# Patient Record
Sex: MALE | Race: BLACK OR AFRICAN AMERICAN
[De-identification: names, ages, dates, MRNs, and addresses within clinical notes are randomized per-mention and may not be internally consistent; named-entity substitution may affect disease eponyms.]

---

## 2018-01-12 ENCOUNTER — HOSPITAL ENCOUNTER (INPATIENT)
Dept: HOSPITAL 74 - YASAS | Age: 43
LOS: 14 days | Discharge: HOME | DRG: 772 | End: 2018-01-26
Attending: PSYCHIATRY & NEUROLOGY | Admitting: PSYCHIATRY & NEUROLOGY
Payer: COMMERCIAL

## 2018-01-12 VITALS — BODY MASS INDEX: 26.4 KG/M2

## 2018-01-12 DIAGNOSIS — F17.210: ICD-10-CM

## 2018-01-12 DIAGNOSIS — F16.20: Primary | ICD-10-CM

## 2018-01-12 DIAGNOSIS — F32.9: ICD-10-CM

## 2018-01-12 LAB
ALBUMIN SERPL-MCNC: 3.9 G/DL (ref 3.4–5)
ALP SERPL-CCNC: 79 U/L (ref 45–117)
ALT SERPL-CCNC: 29 U/L (ref 12–78)
ANION GAP SERPL CALC-SCNC: 8 MMOL/L (ref 8–16)
APPEARANCE UR: (no result)
AST SERPL-CCNC: 21 U/L (ref 15–37)
BILIRUB SERPL-MCNC: 0.5 MG/DL (ref 0.2–1)
BILIRUB UR STRIP.AUTO-MCNC: NEGATIVE MG/DL
BUN SERPL-MCNC: 12 MG/DL (ref 7–18)
CALCIUM SERPL-MCNC: 9.3 MG/DL (ref 8.5–10.1)
CHLORIDE SERPL-SCNC: 105 MMOL/L (ref 98–107)
CO2 SERPL-SCNC: 29 MMOL/L (ref 21–32)
COLOR UR: (no result)
CREAT SERPL-MCNC: 0.9 MG/DL (ref 0.7–1.3)
DEPRECATED RDW RBC AUTO: 12.5 % (ref 11.9–15.9)
GLUCOSE SERPL-MCNC: 81 MG/DL (ref 74–106)
HCT VFR BLD CALC: 40.7 % (ref 35.4–49)
HGB BLD-MCNC: 13.4 GM/DL (ref 11.7–16.9)
KETONES UR QL STRIP: NEGATIVE
LEUKOCYTE ESTERASE UR QL STRIP.AUTO: NEGATIVE
MCH RBC QN AUTO: 32.7 PG (ref 25.7–33.7)
MCHC RBC AUTO-ENTMCNC: 32.8 G/DL (ref 32–35.9)
MCV RBC: 99.8 FL (ref 80–96)
NITRITE UR QL STRIP: NEGATIVE
PH UR: 5 [PH] (ref 5–8)
PLATELET # BLD AUTO: 336 K/MM3 (ref 134–434)
PMV BLD: 8.2 FL (ref 7.5–11.1)
POTASSIUM SERPLBLD-SCNC: 4.2 MMOL/L (ref 3.5–5.1)
PROT SERPL-MCNC: 7.1 G/DL (ref 6.4–8.2)
PROT UR QL STRIP: NEGATIVE
PROT UR QL STRIP: NEGATIVE
RBC # BLD AUTO: 4.08 M/MM3 (ref 4–5.6)
RBC # UR STRIP: NEGATIVE /UL
SODIUM SERPL-SCNC: 142 MMOL/L (ref 136–145)
SP GR UR: 1.03 (ref 1–1.03)
UROBILINOGEN UR STRIP-MCNC: 2 MG/DL (ref 0.2–1)
WBC # BLD AUTO: 8.5 K/MM3 (ref 4–10)

## 2018-01-12 PROCEDURE — HZ42ZZZ GROUP COUNSELING FOR SUBSTANCE ABUSE TREATMENT, COGNITIVE-BEHAVIORAL: ICD-10-PCS | Performed by: PSYCHIATRY & NEUROLOGY

## 2018-01-12 RX ADMIN — NICOTINE SCH: 14 PATCH, EXTENDED RELEASE TRANSDERMAL at 14:02

## 2018-01-12 RX ADMIN — Medication SCH: at 22:21

## 2018-01-12 NOTE — HP
Psychiatrist Admission





- Data


Date of interview: 01/12/18


Admission source: [facility] 


Identifying data: This is the second [facility] 

admission for this 42 years old single Black male, father of 22 children, 

unemployed on public assistance, domiciled living in parolee housing


Medical History: Unremakable. Smokes 4 cigarettes daily


Psychiatric History: Denies history of previous psychiatric treatment


Physical/Sexual Abuse/Trauma History: Denies history of verbal, physical or 

sexual abuse as well as DV relationship. No  service


Additional Comment: Reportshistory of multiple previous arrests including  2 

felony conviction. Reports being on both probation & parole till 2022


Vital Signs: 


 Vital Signs - 24 hr











  01/12/18





  09:47


 


Temperature 97.4 F L


 


Pulse Rate 71


 


Respiratory 18





Rate 


 


Blood Pressure 123/84











Allergies/Adverse Reactions: 


 Allergies











Allergy/AdvReac Type Severity Reaction Status Date / Time


 


No Known Allergies Allergy   Verified 01/12/18 11:52











Date of last physical exam: 01/12/18


Concur with the findings of this exam: Yes





- Substance Abuse/Tx History


Hx Alcohol Use: No


Hx Substance Use: Yes (Began using pcp at 25, smokes 2-6 bags 3-6x wkly. Last 

smoked on 1/9/18)


Hx Substance Use Treatment: Yes (Currently attends [facility]. One previous inpt 

detox & 6 inpt rehab)





Mental Status Exam





- Mental Status Exam


Alert and Oriented to: Place, Person


Cognitive Function: Fair


Patient Appearance: Well Groomed


Mood: Hopeful, Euthymic


Patient Behavior: Cooperative


Speech Pattern: Clear


Voice Loudness: Normal


Thought Process: Intact, Goal Oriented


Thought Disorder: Not Present


Hallucinations: Denies


Suicidal Ideation: Denies


Homicidal Ideation: Denies


Insight/Judgement: Fair


Sleep: Well


Appetite: Good


Muscle strength/Tone: Normal


Gait/Station: Normal





Psychiatric Findings





- Problem List (Axis 1, 2,3)


(1) Phencyclidine dependence


Current Visit: Yes   Status: Acute   





(2) Nicotine dependence


Current Visit: Yes   Status: Chronic   





- Initial Treatment Plan


Initial Treatment Plan: Monitor progress

## 2018-01-12 NOTE — HP
Admission [facility]





- [facility]


Chief Complaint: 





requesting inpatietn rehab from pcp


Allergies/Adverse Reactions: 


 Allergies











Allergy/AdvReac Type Severity Reaction Status Date / Time


 


No Known Allergies Allergy   Verified 01/12/18 11:52











History of Present Illness: 





41 yo m requesting inpatietn rehab from pcp, has been here before no other 

medical illnesses, no h/o seizures, no si at this time, mandated to treatment 

as per patient


Exam Limitations: No Limitations





- Ebola screening


Have you traveled outside of the country in the last 21 days: No (N)


Have you had contact with anyone from an Ebola affected area: No


Have you been sick,other than usual withdrawal symptoms: No


Do you have a fever: No





- Review of Systems


Constitutional: No Symptoms Reported


EENT: reports: No Symptoms Reported


Respiratory: reports: No Symptoms reported


Cardiac: reports: No Symptoms Reported


GI: reports: No Symptoms Reported


: reports: No Symptoms Reported


Musculoskeletal: reports: No Symptoms Reported


Integumentary: reports: No Symptoms Reported


Neuro: reports: No Symptoms reported


Endocrine: reports: No Symptoms Reported


Hematology: reports: No Symptoms Reported


Psychiatric: reports: Judgement Intact, Mood/Affect Appropiate, Orientated x3, 

Anxious, Depressed


Other Systems: Reviewed and Negative





Patient History





- Patient Medical History


Hx Anemia: No


Hx Asthma: No


Hx Chronic Obstructive Pulmonary Disease (COPD): No


Hx Cancer: No


Hx Cardiac Disorders: No


Hx Congestive Heart Failure: No


Hx Hypertension: No


Hx Hypercholesterolemia: No


Hx Pacemaker: No


HX Cerebrovascular Accident: No


Hx Seizures: No


Hx Dementia: No


Hx Diabetes: No


Hx Gastrointestinal Disorders: No


Hx Liver Disease: No


Hx Genitourinary Disorders: No


Hx Sexually Transmitted Disorders: No


Hx Renal Disease (ESRD): No


Hx Thyroid Disease: No


Hx Human Immunodeficiency Virus (HIV): No


Hx Hepatitis C: No


Hx Depression: Yes (no meds)


Hx Suicide Attempt: No


Hx Bipolar Disorder: No


Hx Schizophrenia: No





- Patient Surgical History


Past Surgical History: No


Anesthesia Reaction: No





- PPD History


Documented Results: Negative w/o proof


PPD to be Administered?: Yes





- Reproductive History


Patient is a Female of Child Bearing Age (11 -55 yrs old): No


Patient Pregnant: No





- Smoking Cessation


Smoking history: Current every day smoker


Have you smoked in the past 12 months: Yes


Aproximately how many cigarettes per day: 4


Hx Chewing Tobacco Use: No


Initiated information on smoking cessation: Yes


'Breaking Loose' booklet given: 01/12/18





- Substance & Tx. History


Hx Alcohol Use: No


Hx Substance Use: Yes (pcp)


Hx Substance Use Treatment: Yes ([facility])





- Substances Abused


  ** PCP


Route: Smoking


Frequency: 3-6 times per week


Amount used: 2-6 BAGS


Age of first use: 25


Date of Last Use: 01/09/18





Family Disease History





- Family Disease History


Family History: Denies





Admission Physical Exam [facility]





- Vital Signs


Vital Signs: 


 Vital Signs - 24 hr











  01/12/18





  09:47


 


Temperature 97.4 F L


 


Pulse Rate 71


 


Respiratory 18





Rate 


 


Blood Pressure 123/84














- Physical


General Appearance: Yes: Within Normal Limits, No Apparent Distress, Nourished, 

Appropriately Dressed, Sweating


HEENTM: Yes: Within Normal Limits, EOMI, Hearing grossly Normal, Normal ENT 

Inspection, Normocephalic, Normal Voice, ALESSIA, Pharynx Normal


Respiratory: Yes: Within Normal Limits, Chest Non-Tender, Lungs Clear, Normal 

Breath Sounds, No Respiratory Distress, No Accessory Muscle Use


Neck: Yes: Within Normal Limits, No masses,lesions,Nodules, Supple, Trachea in 

good position


Breast: Yes: Breast Exam Deferred


Cardiology: Yes: Within Normal Limits, Regular Rhythm, Regular Rate, S1, S2


Abdominal: Yes: Within Normal Limits, Normal Bowel Sounds, Non Tender, Flat, 

Soft


Genitourinary: Yes: Within Normal Limits


Back: Yes: Within Normal Limits, Normal Inspection


Musculoskeletal: Yes: Within Normal Limits, full range of Motion, Gait Steady, 

Pelvis Stable


Extremities: Yes: Within Normal Limits, Normal Capillary Refill, Normal 

Inspection, Normal Range of Motion, Non-Tender


Neurological: Yes: Within Normal Limits, CNs II-XII NML intact, Fully Oriented, 

Alert, Motor Strength 5/5, Normal Mood/Affect, Normal Response


Integumentary: Yes: Within Normal Limits, Normal Color, Dry, Warm


Lymphatic: Yes: Within Normal Limits





- Diagnostic


(1) PCP dependence


Current Visit: Yes   Status: Acute   





(2) Nicotine dependence


Current Visit: Yes   Status: Acute   





(3) Depression


Current Visit: Yes   Status: Acute   





Cleared for Admission [facility]





- Detox or Rehab


Claeared for Rehab Admission: Yes





[facility] Breath Alcohol Content


Breath Alcohol Content: 0





Urine Drug Screen





- Results


Drug Screen Negative: No


Urine Drug Screen Results: PCP-Phencyclidine





Inpatient Rehab Admission





- Initial Determination


Are CD services needed?: Yes


Free of communicable disease: Yes


Not in need of hospitalization: Yes





- Rehab Admission Criteria


Previous failed treatment: Yes


Lacks judgement: Yes


Patient is meeting Inpatient Rehab admission criteria:: Yes

## 2018-01-13 RX ADMIN — Medication SCH: at 22:01

## 2018-01-13 RX ADMIN — Medication SCH: at 10:17

## 2018-01-13 RX ADMIN — NICOTINE SCH: 14 PATCH, EXTENDED RELEASE TRANSDERMAL at 10:17

## 2018-01-13 NOTE — EKG
Test Reason : 

Blood Pressure : ***/*** mmHG

Vent. Rate : 062 BPM     Atrial Rate : 062 BPM

   P-R Int : 104 ms          QRS Dur : 086 ms

    QT Int : 418 ms       P-R-T Axes : 026 032 016 degrees

   QTc Int : 424 ms

 

SINUS RHYTHM WITH SHORT WA

OTHERWISE NORMAL ECG

NO PREVIOUS ECGS AVAILABLE

Confirmed by SHIRA MANCINI MD ([id number]) on 1/13/2018 4:48:42 PM

 

Referred By:             Confirmed By:SHIRA MANCINI MD

## 2018-01-14 RX ADMIN — NICOTINE SCH: 14 PATCH, EXTENDED RELEASE TRANSDERMAL at 10:03

## 2018-01-14 RX ADMIN — Medication SCH: at 10:03

## 2018-01-14 RX ADMIN — Medication SCH: at 22:07

## 2018-01-15 RX ADMIN — Medication SCH: at 09:57

## 2018-01-15 RX ADMIN — NICOTINE SCH: 14 PATCH, EXTENDED RELEASE TRANSDERMAL at 09:57

## 2018-01-16 RX ADMIN — Medication SCH: at 18:41

## 2018-01-16 RX ADMIN — NICOTINE SCH: 14 PATCH, EXTENDED RELEASE TRANSDERMAL at 10:22

## 2018-01-16 RX ADMIN — Medication SCH: at 23:15

## 2018-01-16 RX ADMIN — Medication SCH TAB: at 10:22

## 2018-01-17 RX ADMIN — NICOTINE SCH: 14 PATCH, EXTENDED RELEASE TRANSDERMAL at 10:02

## 2018-01-17 RX ADMIN — Medication SCH TAB: at 10:02

## 2018-01-18 RX ADMIN — Medication SCH: at 22:57

## 2018-01-18 RX ADMIN — Medication SCH: at 01:18

## 2018-01-18 RX ADMIN — Medication SCH TAB: at 10:23

## 2018-01-18 RX ADMIN — Medication SCH: at 10:22

## 2018-01-18 RX ADMIN — NICOTINE SCH: 14 PATCH, EXTENDED RELEASE TRANSDERMAL at 10:22

## 2018-01-19 RX ADMIN — Medication SCH: at 22:04

## 2018-01-19 RX ADMIN — NICOTINE SCH: 14 PATCH, EXTENDED RELEASE TRANSDERMAL at 10:20

## 2018-01-19 RX ADMIN — Medication SCH TAB: at 10:19

## 2018-01-20 RX ADMIN — NICOTINE SCH: 14 PATCH, EXTENDED RELEASE TRANSDERMAL at 10:12

## 2018-01-20 RX ADMIN — Medication SCH: at 21:50

## 2018-01-20 RX ADMIN — Medication SCH TAB: at 10:12

## 2018-01-21 RX ADMIN — Medication SCH: at 22:10

## 2018-01-21 RX ADMIN — NICOTINE SCH: 14 PATCH, EXTENDED RELEASE TRANSDERMAL at 10:03

## 2018-01-21 RX ADMIN — Medication SCH TAB: at 10:03

## 2018-01-22 RX ADMIN — NICOTINE SCH: 14 PATCH, EXTENDED RELEASE TRANSDERMAL at 09:51

## 2018-01-22 RX ADMIN — Medication SCH: at 22:16

## 2018-01-22 RX ADMIN — Medication SCH TAB: at 09:50

## 2018-01-23 RX ADMIN — Medication SCH TAB: at 10:11

## 2018-01-23 RX ADMIN — Medication SCH: at 22:26

## 2018-01-23 RX ADMIN — NICOTINE SCH: 14 PATCH, EXTENDED RELEASE TRANSDERMAL at 10:11

## 2018-01-24 RX ADMIN — Medication SCH TAB: at 10:08

## 2018-01-24 RX ADMIN — NICOTINE SCH: 14 PATCH, EXTENDED RELEASE TRANSDERMAL at 10:08

## 2018-01-24 RX ADMIN — Medication SCH: at 22:15

## 2018-01-25 RX ADMIN — NICOTINE SCH: 14 PATCH, EXTENDED RELEASE TRANSDERMAL at 10:05

## 2018-01-25 RX ADMIN — Medication SCH TAB: at 10:05

## 2018-01-25 RX ADMIN — Medication SCH: at 21:59

## 2018-01-25 NOTE — PN
Psychiatric Progress Note


Vital Signs: 


 Vital Signs











 Period  Temp  Pulse  Resp  BP Sys/Rae  Pulse Ox


 


 Last 24 Hr  98.0 F  60  18-18  122/76  











Date of Session: 01/25/18


Chief Complaint:: Discharge Note


HPI: Patient addressing Phencyclidine Dependence comorbid with Nicotine 

Dependence


Current Medications: 


Active Medications











Generic Name Dose Route Start Last Admin





  Trade Name Freq  PRN Reason Stop Dose Admin


 


Acetaminophen  650 mg  01/12/18 12:45  





  Tylenol -  PO   





  Q4H PRN   





  FEVER   


 


Al Hydroxide/Mg Hydroxide  30 ml  01/12/18 12:45  





  Mylanta Oral Suspension -  PO   





  Q6H PRN   





  DYSPEPSIA   


 


Eucalyptus/Menthol/Phenol/Sorbitol  1 each  01/12/18 12:45  





  Cepastat Lozenge -  MM   





  Q4H PRN   





  SORE THROAT   


 


Guaifenesin  10 ml  01/12/18 12:45  





  Robitussin Dm -  PO   





  Q6H PRN   





  COUGH   


 


Hydroxyzine Pamoate  50 mg  01/12/18 12:45  





  Vistaril -  PO   





  Q4H PRN   





  AGITATION   


 


Ibuprofen  400 mg  01/12/18 12:45  





  Motrin -  PO   





  Q6H PRN   





  Pain level 4-6   


 


Loperamide HCl  4 mg  01/12/18 12:45  





  Imodium -  PO   





  Q6H PRN   





  DIARRHEA   


 


Magnesium Citrate  300 ml  01/12/18 12:45  





  Citroma -  PO   





  Q48H PRN   





  CONSTIPATION   


 


Magnesium Hydroxide  30 ml  01/12/18 12:45  





  Milk Of Magnesia -  PO   





  DAILY PRN   





  CONSTIPATION   


 


Nicotine  14 mg  01/12/18 13:50  01/25/18 10:05





  Nicoderm Patch -  TD   Not Given





  DAILY MIREYA   


 


Nicotine Polacrilex  2 mg  01/12/18 12:45  





  Nicorette Gum -  BUC   





  Q2H PRN   





  NICOTINE REPLACEMENT RX   


 


Prenatal Multivit/Folic Acid/Iron  1 tab  01/13/18 10:00  01/25/18 10:05





  Prenatal Vitamins (Sjr) -  PO   1 tab





  DAILY MIREYA   Administration


 


Pseudoephedrine/Triprolidine  1 combo  01/12/18 12:45  





  Actifed -  PO   





  TID PRN   





  NASAL CONGESTION   


 


Thiamine HCl  100 mg  01/12/18 22:00  01/24/18 22:15





  Vitamin B1 -  PO   Not Given





  HS MIREYA   











Current Side Effect: No


Lab tests ordered: Yes


Lab tests reviewed: Yes


Provider note:: Patient will complete this program on 1/26/18. He has met is 

treatment goals and will continue to address his issues in outpatient treatment 

at [facility] at [address] [place] [address]. He verbalized understanding of 

the consequences of his addiction and from his participation in this program, 

he has learned to identify his triggers and the importance of having someone to 

call when he feels like using. He is stable for discharge on 1/26/18


Total face to face time:: 35





Mental Status Exam





- Mental Status Exam


Alert and Oriented to: Time, Place, Person


Cognitive Function: Fair


Patient Appearance: Well Groomed


Mood: Hopeful, Euthymic


Affect: Appropriate


Patient Behavior: Cooperative


Speech Pattern: Clear


Voice Loudness: Normal


Thought Process: Intact, Goal Oriented


Thought Disorder: Not Present


Hallucinations: Denies


Suicidal Ideation: Denies


Homicidal Ideation: Denies


Insight/Judgement: Fair


Sleep: Well


Appetite: Good


Muscle strength/Tone: Normal


Gait/Station: Normal





Psychiatric Treatment Plan





- Problem List


(1) Phencyclidine dependence


Current Visit: Yes   





(2) Nicotine dependence


Current Visit: Yes   


Initial treatment plan: Patient will be discharged tomorrow and referred to [facility] for outpatient treatment

## 2018-01-26 VITALS — HEART RATE: 65 BPM | SYSTOLIC BLOOD PRESSURE: 123 MMHG | DIASTOLIC BLOOD PRESSURE: 76 MMHG | TEMPERATURE: 97.6 F

## 2018-01-26 RX ADMIN — Medication SCH TAB: at 09:36

## 2018-01-26 RX ADMIN — NICOTINE SCH: 14 PATCH, EXTENDED RELEASE TRANSDERMAL at 09:36
